# Patient Record
Sex: FEMALE | Race: WHITE | NOT HISPANIC OR LATINO | URBAN - METROPOLITAN AREA
[De-identification: names, ages, dates, MRNs, and addresses within clinical notes are randomized per-mention and may not be internally consistent; named-entity substitution may affect disease eponyms.]

---

## 2023-08-30 PROBLEM — R42 VERTIGO: Status: ACTIVE | Noted: 2023-08-30

## 2023-08-30 PROBLEM — F41.9 ANXIETY: Status: ACTIVE | Noted: 2023-08-30

## 2023-08-30 PROBLEM — E66.01 MORBID (SEVERE) OBESITY DUE TO EXCESS CALORIES (MULTI): Status: ACTIVE | Noted: 2023-08-30

## 2023-08-30 PROBLEM — E78.5 HYPERLIPIDEMIA: Status: ACTIVE | Noted: 2023-08-30

## 2023-08-30 PROBLEM — R53.83 FATIGUE: Status: ACTIVE | Noted: 2023-08-30

## 2023-08-30 PROBLEM — K64.8 INTERNAL HEMORRHOIDS: Status: ACTIVE | Noted: 2023-08-30

## 2023-08-30 PROBLEM — K29.90 GASTRITIS AND DUODENITIS: Status: ACTIVE | Noted: 2023-08-30

## 2023-08-30 PROBLEM — E66.9 OBESITY (BMI 30.0-34.9): Status: ACTIVE | Noted: 2023-08-30

## 2023-08-30 PROBLEM — R94.31 ABNORMAL EKG: Status: ACTIVE | Noted: 2023-08-30

## 2023-08-30 PROBLEM — F32.A DEPRESSION: Status: ACTIVE | Noted: 2023-08-30

## 2023-08-30 PROBLEM — I10 HYPERTENSION: Status: ACTIVE | Noted: 2023-08-30

## 2023-08-30 PROBLEM — R25.3 EYE MUSCLE TWITCHES: Status: ACTIVE | Noted: 2023-08-30

## 2023-08-30 PROBLEM — M54.31 RIGHT SIDED SCIATICA: Status: ACTIVE | Noted: 2023-08-30

## 2023-08-30 PROBLEM — E55.9 VITAMIN D DEFICIENCY: Status: ACTIVE | Noted: 2023-08-30

## 2023-08-30 PROBLEM — G47.33 OBSTRUCTIVE SLEEP APNEA SYNDROME: Status: ACTIVE | Noted: 2023-08-30

## 2023-08-30 PROBLEM — E66.811 OBESITY (BMI 30.0-34.9): Status: ACTIVE | Noted: 2023-08-30

## 2023-08-30 RX ORDER — AMLODIPINE BESYLATE 10 MG/1
1 TABLET ORAL DAILY
COMMUNITY
Start: 2022-07-21 | End: 2023-12-07

## 2023-08-30 RX ORDER — SEMAGLUTIDE 1.34 MG/ML
INJECTION, SOLUTION SUBCUTANEOUS
COMMUNITY

## 2023-08-30 RX ORDER — METOPROLOL SUCCINATE 100 MG/1
1 TABLET, EXTENDED RELEASE ORAL DAILY
COMMUNITY
Start: 2022-08-03 | End: 2024-02-14

## 2023-08-30 RX ORDER — SEMAGLUTIDE 1.34 MG/ML
INJECTION, SOLUTION SUBCUTANEOUS
COMMUNITY
Start: 2023-04-24

## 2023-08-30 RX ORDER — IBUPROFEN 600 MG/1
TABLET ORAL
COMMUNITY
Start: 2023-03-03

## 2023-08-30 RX ORDER — VENLAFAXINE HYDROCHLORIDE 150 MG/1
2 CAPSULE, EXTENDED RELEASE ORAL DAILY
COMMUNITY
Start: 2022-01-21

## 2023-10-10 ENCOUNTER — TELEPHONE (OUTPATIENT)
Dept: ENDOCRINOLOGY | Facility: CLINIC | Age: 48
End: 2023-10-10
Payer: COMMERCIAL

## 2023-11-15 ENCOUNTER — OFFICE VISIT (OUTPATIENT)
Dept: ENDOCRINOLOGY | Facility: CLINIC | Age: 48
End: 2023-11-15
Payer: COMMERCIAL

## 2023-11-15 VITALS
WEIGHT: 190 LBS | HEART RATE: 92 BPM | BODY MASS INDEX: 32.61 KG/M2 | SYSTOLIC BLOOD PRESSURE: 149 MMHG | DIASTOLIC BLOOD PRESSURE: 99 MMHG

## 2023-11-15 DIAGNOSIS — I10 PRIMARY HYPERTENSION: ICD-10-CM

## 2023-11-15 DIAGNOSIS — E66.01 MORBID (SEVERE) OBESITY DUE TO EXCESS CALORIES (MULTI): Primary | ICD-10-CM

## 2023-11-15 DIAGNOSIS — E66.9 OBESITY (BMI 30.0-34.9): ICD-10-CM

## 2023-11-15 DIAGNOSIS — E78.2 MIXED HYPERLIPIDEMIA: ICD-10-CM

## 2023-11-15 PROCEDURE — 3080F DIAST BP >= 90 MM HG: CPT | Performed by: NURSE PRACTITIONER

## 2023-11-15 PROCEDURE — 1036F TOBACCO NON-USER: CPT | Performed by: NURSE PRACTITIONER

## 2023-11-15 PROCEDURE — 3008F BODY MASS INDEX DOCD: CPT | Performed by: NURSE PRACTITIONER

## 2023-11-15 PROCEDURE — 3077F SYST BP >= 140 MM HG: CPT | Performed by: NURSE PRACTITIONER

## 2023-11-15 PROCEDURE — 99213 OFFICE O/P EST LOW 20 MIN: CPT | Performed by: NURSE PRACTITIONER

## 2023-11-15 ASSESSMENT — PATIENT HEALTH QUESTIONNAIRE - PHQ9
2. FEELING DOWN, DEPRESSED OR HOPELESS: SEVERAL DAYS
2. FEELING DOWN, DEPRESSED OR HOPELESS: NOT AT ALL
SUM OF ALL RESPONSES TO PHQ9 QUESTIONS 1 & 2: 0
1. LITTLE INTEREST OR PLEASURE IN DOING THINGS: NOT AT ALL

## 2023-11-15 ASSESSMENT — LIFESTYLE VARIABLES
HOW MANY STANDARD DRINKS CONTAINING ALCOHOL DO YOU HAVE ON A TYPICAL DAY: 1 OR 2
HOW OFTEN DO YOU HAVE A DRINK CONTAINING ALCOHOL: MONTHLY OR LESS

## 2023-11-15 ASSESSMENT — PAIN SCALES - GENERAL: PAINLEVEL: 6

## 2023-11-15 NOTE — PROGRESS NOTES
"      Chief Complaint   Patient presents with    Weight Check         HPI:    49 yo presents for follow up/ wt managment. Pt with Obesity BMI 37, depression, htn, borderline lipids, DICK on cpap. Ozempic was increased to 1mg dose weekly at her last visit, has lost 18 pounds in between visit. Weight was 227 pounds Nov 2022, current weight 190 pounds.         -pt weighted about 130lbs in early 20's prior to pregnancy at ht of 5'4\"         -weight gain has been gradual during her 4 pregnancies         -during stressful times pt has overeaten and gained wt         -pt had tried WW in the past with some result many years ago         -had lost 10lbs in the past doing Keto diet         -during covid-19 pt gained as much as 24 lbs         -pt took contrave about 1 year and was able to lose 10lbs, when she stopped it she regained this wt plus an additional 10lbs         Diet: some struggles when stressed, children at home and tempted with some of their snacks         -pt is physical at work on feet/walking.          Current Outpatient Medications:     amLODIPine (Norvasc) 10 mg tablet, Take 1 tablet (10 mg) by mouth once daily., Disp: , Rfl:     ibuprofen 600 mg tablet, , Disp: , Rfl:     triamcinolone acetonide (NASACORT AQ NASL), , Disp: , Rfl:     venlafaxine XR (Effexor-XR) 150 mg 24 hr capsule, Take 2 capsules (300 mg) by mouth once daily., Disp: , Rfl:     metoprolol succinate XL (Toprol-XL) 100 mg 24 hr tablet, Take 1 tablet (100 mg) by mouth once daily., Disp: , Rfl:     semaglutide (Ozempic) 0.25 mg or 0.5 mg(2 mg/1.5 mL) pen injector, , Disp: , Rfl:     semaglutide (Ozempic) 1 mg/dose (4 mg/3 mL) pen injector, , Disp: , Rfl:     BP (!) 149/99 (BP Location: Left arm, Patient Position: Sitting)   Pulse 92   Wt 86.2 kg (190 lb)   BMI 32.61 kg/m²      Past Medical History:   Diagnosis Date    Borderline high cholesterol     Depression     Hypertension     Obesity     Seasonal allergies     Sleep apnea           ROS: "      CARDIOLOGY:          Lightheadedness denies.  Chest pain denies.  Leg edema denies.  Palpitations denies.         RESPIRATORY:          Cough denies.  Shortness of breath denies.  Wheezing denies.         GASTROENTEROLOGY:          Abdominal pain denies.  Constipation denies.  Diarrhea denies.  Heartburn denies.         ENDOCRINOLOGY:          Cold intolerance denies.  Excessive sweating denies.  Heat intolerance denies.  Tremulousness denies.         NEUROLOGY:          Burning pain in feet denies.  Burning pain in hands denies.         PSYCHOLOGY:          Low energy denies.  Irritability denies.  Sleep disturbances denies.           Examination:     General Examination:         GENERAL APPEARANCE:  Pleasant and cooperative,No Acute Distress.          NECK: no lymphadenopathy,no thyromegaly, no dominant thyroid nodules.          HEART: no murmurs, click or rubs, regular rate and rhythm, normal S1S2.          LUNGS: clear to auscultation bilaterally, no wheezes/rhonchi/rales.          EXTREMITIES: no edema, 2+ pulses           Lab Results   Component Value Date    TSH 1.62 07/05/2022       Chemistry    Lab Results   Component Value Date/Time     07/05/2022 1411    K 4.2 07/05/2022 1411     07/05/2022 1411    CO2 25 07/05/2022 1411    BUN 13 07/05/2022 1411    CREATININE 0.9 07/05/2022 1411    Lab Results   Component Value Date/Time    CALCIUM 9.1 07/05/2022 1411    ALKPHOS 62 07/05/2022 1411    AST 15 07/05/2022 1411    ALT 14 07/05/2022 1411    BILITOT 0.3 07/05/2022 1411          Lab Results   Component Value Date    WBC 8.2 07/05/2022    HGB 14.2 07/05/2022    HCT 42.4 07/05/2022    MCV 88.9 07/05/2022     07/05/2022     Lab Results   Component Value Date    HGBA1C 5.5 03/14/2022       1. Morbid (severe) obesity due to excess calories (CMS/[facility])  -excellent reduction in weight  -continue with 2 mg dose ozempic      2. Primary hypertension  -elevated, did not take med's yet today    Follow  up CNP 6 months      -previous labs and notes reviewed  -labs/tests mentioned in above note reviewed and interpreted  -reviewed and counseled on medication monitoring and side effects.

## 2023-12-07 DIAGNOSIS — I10 ESSENTIAL (PRIMARY) HYPERTENSION: ICD-10-CM

## 2023-12-07 RX ORDER — AMLODIPINE BESYLATE 10 MG/1
10 TABLET ORAL DAILY
Qty: 90 TABLET | Refills: 1 | Status: SHIPPED | OUTPATIENT
Start: 2023-12-07

## 2024-01-08 DIAGNOSIS — E66.9 OBESITY (BMI 30.0-34.9): ICD-10-CM

## 2024-01-08 DIAGNOSIS — E66.01 MORBID (SEVERE) OBESITY DUE TO EXCESS CALORIES (MULTI): Primary | ICD-10-CM

## 2024-01-18 ENCOUNTER — TELEPHONE (OUTPATIENT)
Dept: ENDOCRINOLOGY | Facility: CLINIC | Age: 49
End: 2024-01-18
Payer: COMMERCIAL

## 2024-01-22 ENCOUNTER — TELEPHONE (OUTPATIENT)
Dept: ENDOCRINOLOGY | Facility: CLINIC | Age: 49
End: 2024-01-22
Payer: COMMERCIAL

## 2024-01-22 NOTE — TELEPHONE ENCOUNTER
Pt's Ozempic PA was denied due to the Pt not having a A1C of 6.5 or higher, Pt wanted to let you know. Pt states that she has not had a A1C done in along time, and she has been taking Ozempic for over a year. Please advise.

## 2024-02-14 ENCOUNTER — OFFICE VISIT (OUTPATIENT)
Dept: ENDOCRINOLOGY | Facility: CLINIC | Age: 49
End: 2024-02-14
Payer: COMMERCIAL

## 2024-02-14 VITALS
WEIGHT: 185.8 LBS | HEART RATE: 96 BPM | DIASTOLIC BLOOD PRESSURE: 87 MMHG | BODY MASS INDEX: 31.89 KG/M2 | SYSTOLIC BLOOD PRESSURE: 131 MMHG

## 2024-02-14 DIAGNOSIS — I10 PRIMARY HYPERTENSION: ICD-10-CM

## 2024-02-14 DIAGNOSIS — E66.9 OBESITY (BMI 30.0-34.9): Primary | ICD-10-CM

## 2024-02-14 DIAGNOSIS — E78.2 MIXED HYPERLIPIDEMIA: ICD-10-CM

## 2024-02-14 PROCEDURE — 3075F SYST BP GE 130 - 139MM HG: CPT | Performed by: NURSE PRACTITIONER

## 2024-02-14 PROCEDURE — 3079F DIAST BP 80-89 MM HG: CPT | Performed by: NURSE PRACTITIONER

## 2024-02-14 PROCEDURE — 1036F TOBACCO NON-USER: CPT | Performed by: NURSE PRACTITIONER

## 2024-02-14 PROCEDURE — 99213 OFFICE O/P EST LOW 20 MIN: CPT | Performed by: NURSE PRACTITIONER

## 2024-02-14 PROCEDURE — 3008F BODY MASS INDEX DOCD: CPT | Performed by: NURSE PRACTITIONER

## 2024-02-14 RX ORDER — LISINOPRIL 10 MG/1
10 TABLET ORAL
COMMUNITY
Start: 2024-01-16

## 2024-02-14 RX ORDER — BUPROPION HYDROCHLORIDE 150 MG/1
1 TABLET ORAL
COMMUNITY
Start: 2024-01-29

## 2024-02-14 RX ORDER — SEMAGLUTIDE 0.25 MG/.5ML
0.25 INJECTION, SOLUTION SUBCUTANEOUS
Qty: 2 ML | Refills: 0 | Status: SHIPPED | OUTPATIENT
Start: 2024-02-14

## 2024-02-14 ASSESSMENT — PATIENT HEALTH QUESTIONNAIRE - PHQ9
1. LITTLE INTEREST OR PLEASURE IN DOING THINGS: NOT AT ALL
SUM OF ALL RESPONSES TO PHQ9 QUESTIONS 1 AND 2: 0
2. FEELING DOWN, DEPRESSED OR HOPELESS: NOT AT ALL

## 2024-02-14 ASSESSMENT — PAIN SCALES - GENERAL: PAINLEVEL: 6

## 2024-02-14 ASSESSMENT — ENCOUNTER SYMPTOMS: DEPRESSION: 0

## 2024-02-14 NOTE — PROGRESS NOTES
"HPI     49 yo presents for follow up/ wt managment. Pt with Obesity BMI 31 (was 37) depression, htn, borderline lipids, DICK on cpap. Ozempic no longer covered for weight loss. Last dose Dec 2023. Lowest weight 183 lbs. Weight was 227 pounds Nov 2022, current weight 185 pounds. She would like to see if Wegovy is covered.            -pt weighted about 130lbs in early 20's prior to pregnancy at ht of 5'4\"         -weight gain has been gradual during her 4 pregnancies         -during stressful times pt has overeaten and gained wt         -pt had tried WW in the past with some result many years ago         -had lost 10lbs in the past doing Keto diet         -during covid-19 pt gained as much as 24 lbs         -pt took contrave about 1 year and was able to lose 10lbs, when she stopped it she regained this wt plus an additional 10lbs         Diet: some struggles when stressed, children at home and tempted with some of their snacks         -pt is physical at work on feet/walking.        Current Outpatient Medications:     amLODIPine (Norvasc) 10 mg tablet, TAKE 1 TABLET BY MOUTH EVERY DAY, Disp: 90 tablet, Rfl: 1    buPROPion XL (Wellbutrin XL) 150 mg 24 hr tablet, Take 1 tablet (150 mg) by mouth once daily in the morning. Take before meals., Disp: , Rfl:     ibuprofen 600 mg tablet, , Disp: , Rfl:     lisinopril 10 mg tablet, Take 1 tablet (10 mg) by mouth once daily., Disp: , Rfl:     triamcinolone acetonide (NASACORT AQ NASL), , Disp: , Rfl:     triamcinolone acetonide (NASACORT AQ NASL), Nasacort AQ, Disp: , Rfl:     venlafaxine XR (Effexor-XR) 150 mg 24 hr capsule, Take 2 capsules (300 mg) by mouth once daily., Disp: , Rfl:     semaglutide (Ozempic) 0.25 mg or 0.5 mg(2 mg/1.5 mL) pen injector, , Disp: , Rfl:     semaglutide (Ozempic) 1 mg/dose (4 mg/3 mL) pen injector, , Disp: , Rfl:     semaglutide, weight loss, (Wegovy) 0.25 mg/0.5 mL pen injector, Inject 0.25 mg under the skin every 7 days., Disp: 2 mL, Rfl: " 0      Allergies as of 02/14/2024    (No Known Allergies)         Review of Systems   Cardiology: Lightheadedness-denies.  Chest pain-denies.  Leg edema-denies.  Palpitations-denies.  Respiratory: Cough-denies. Shortness of breath-denies.  Wheezing-denies.  Gastroenterology: Constipation-denies.  Diarrhea-denies.  Heartburn-denies.  Endocrinology: Cold intolerance-denies.  Heat intolerance-denies.  Sweats-denies.  Neurology: Headache-denies.  Tremor-denies.  Neuropathy in extremities-denies.  Psychology: Low energy-denies.  Irritability-denies.  Sleep disturbances-denies.      /87   Pulse 96   Wt 84.3 kg (185 lb 12.8 oz)   BMI 31.89 kg/m²       Labs:  Lab Results   Component Value Date    WBC 8.2 07/05/2022    NRBC 0 07/05/2022    RBC 4.77 07/05/2022    HGB 14.2 07/05/2022    HCT 42.4 07/05/2022     07/05/2022     Lab Results   Component Value Date    CALCIUM 9.1 07/05/2022    AST 15 07/05/2022    ALKPHOS 62 07/05/2022    BILITOT 0.3 07/05/2022    PROT 7.1 07/05/2022    ALBUMIN 4.2 07/05/2022    GLOB 2.9 07/05/2022    AGR 1.4 (L) 07/05/2022     07/05/2022    K 4.2 07/05/2022     07/05/2022    CO2 25 07/05/2022    ANIONGAP 11 07/05/2022    BUN 13 07/05/2022    CREATININE 0.9 07/05/2022    UREACREAUR 14.4 07/05/2022    GLUCOSE 91 07/05/2022    ALT 14 07/05/2022    EGFR 79 07/05/2022     Lab Results   Component Value Date    CHOL 203 (H) 01/23/2020    TRIG 183 (H) 01/23/2020    HDL 48 (L) 01/23/2020    LDLCALC 118 01/23/2020     Lab Results   Component Value Date    TSH 1.62 07/05/2022     Lab Results   Component Value Date    HURGHKEP38 621 11/27/2018     Lab Results   Component Value Date    HGBA1C 5.5 03/14/2022         Physical Exam   General Appearance: pleasant, cooperative, no acute distress  HEENT: no chemosis, no proptosis, no lid lag, no lid retraction  Neck: no lymphadenopathy, no thyromegaly, no dominant thyroid nodules  Heart: no murmurs, regular rate and rhythm, S1 and  S2  Lungs: no wheezes, no rhonci, no rales  Extremities: no lower extremity swelling      Assessment/Plan   1. Obesity (BMI 30.0-34.9)  -meets criteria for GLP1 therapy  -has shown to be effective with recent use of Ozempic  -high risk co-morbidities     - semaglutide, weight loss, (Wegovy) 0.25 mg/0.5 mL pen injector; Inject 0.25 mg under the skin every 7 days.  Dispense: 2 mL; Refill: 0    2. Primary hypertension  -managed by PCP    3. Mixed hyperlipidemia  -managed by PC       Follow Up: 3 months if Wegovy started      -labs/tests/notes reviewed  -reviewed and counseled patient on medication monitoring and side effects

## 2024-03-22 DIAGNOSIS — E66.8 OTHER OBESITY: ICD-10-CM

## 2024-03-22 RX ORDER — SEMAGLUTIDE 0.68 MG/ML
INJECTION, SOLUTION SUBCUTANEOUS
Qty: 3 ML | Refills: 6 | Status: SHIPPED | OUTPATIENT
Start: 2024-03-22

## 2024-05-15 ENCOUNTER — APPOINTMENT (OUTPATIENT)
Dept: ENDOCRINOLOGY | Facility: CLINIC | Age: 49
End: 2024-05-15
Payer: COMMERCIAL

## 2024-06-14 ENCOUNTER — APPOINTMENT (OUTPATIENT)
Dept: ENDOCRINOLOGY | Facility: CLINIC | Age: 49
End: 2024-06-14
Payer: COMMERCIAL

## 2024-06-14 VITALS
DIASTOLIC BLOOD PRESSURE: 92 MMHG | HEIGHT: 64 IN | HEART RATE: 91 BPM | BODY MASS INDEX: 33.12 KG/M2 | SYSTOLIC BLOOD PRESSURE: 130 MMHG | WEIGHT: 194 LBS

## 2024-06-14 DIAGNOSIS — E66.9 OBESITY (BMI 30.0-34.9): Primary | ICD-10-CM

## 2024-06-14 DIAGNOSIS — E78.2 MIXED HYPERLIPIDEMIA: ICD-10-CM

## 2024-06-14 DIAGNOSIS — I10 PRIMARY HYPERTENSION: ICD-10-CM

## 2024-06-14 PROCEDURE — 1036F TOBACCO NON-USER: CPT | Performed by: NURSE PRACTITIONER

## 2024-06-14 PROCEDURE — 3080F DIAST BP >= 90 MM HG: CPT | Performed by: NURSE PRACTITIONER

## 2024-06-14 PROCEDURE — 99213 OFFICE O/P EST LOW 20 MIN: CPT | Performed by: NURSE PRACTITIONER

## 2024-06-14 PROCEDURE — 3075F SYST BP GE 130 - 139MM HG: CPT | Performed by: NURSE PRACTITIONER

## 2024-06-14 RX ORDER — SEMAGLUTIDE 0.5 MG/.5ML
0.5 INJECTION, SOLUTION SUBCUTANEOUS
Qty: 2 ML | Refills: 2 | Status: SHIPPED | OUTPATIENT
Start: 2024-06-28

## 2024-06-14 ASSESSMENT — ENCOUNTER SYMPTOMS
OCCASIONAL FEELINGS OF UNSTEADINESS: 0
DEPRESSION: 0
LOSS OF SENSATION IN FEET: 0

## 2024-06-14 ASSESSMENT — LIFESTYLE VARIABLES
SKIP TO QUESTIONS 9-10: 1
AUDIT-C TOTAL SCORE: 1
HOW OFTEN DO YOU HAVE SIX OR MORE DRINKS ON ONE OCCASION: NEVER
HOW OFTEN DO YOU HAVE A DRINK CONTAINING ALCOHOL: MONTHLY OR LESS
HOW MANY STANDARD DRINKS CONTAINING ALCOHOL DO YOU HAVE ON A TYPICAL DAY: 1 OR 2

## 2024-06-14 ASSESSMENT — PAIN SCALES - GENERAL: PAINLEVEL: 0-NO PAIN

## 2024-06-14 ASSESSMENT — PATIENT HEALTH QUESTIONNAIRE - PHQ9
2. FEELING DOWN, DEPRESSED OR HOPELESS: NOT AT ALL
SUM OF ALL RESPONSES TO PHQ9 QUESTIONS 1 & 2: 0
1. LITTLE INTEREST OR PLEASURE IN DOING THINGS: NOT AT ALL

## 2024-06-14 NOTE — PATIENT INSTRUCTIONS
CALL ME IN 2 WEEKS SO I CAN INCREASE DOSE  IF YOU TOLERATE 0.50 MG IN 4 WEEKS I CAN SEND 1 MG DOSE  WE CAN INCREASE THIS MONTHLY IF YOU TOLERATE WITH

## 2024-06-14 NOTE — PROGRESS NOTES
"HPI   49 yo presents for follow up/ wt managment. Pt with Obesity BMI 33 (was 37) depression, htn, borderline lipids, DICK on cpap. Ozempic no longer covered for weight loss. Last dose Dec 2023. Lowest weight 183 lbs. Weight was 227 pounds Wegovy is covered, She just restarted at 0.25 mg dose this week.            -pt weighted about 130lbs in early 20's prior to pregnancy at ht of 5'4\"         -weight gain has been gradual during her 4 pregnancies         -during stressful times pt has overeaten and gained wt         -pt had tried WW in the past with some result many years ago         -had lost 10lbs in the past doing Keto diet         -during covid-19 pt gained as much as 24 lbs         -pt took contrave about 1 year and was able to lose 10lbs, when she stopped it she regained this wt plus an additional 10lbs         Diet: some struggles when stressed, children at home and tempted with some of their snacks         -pt is physical at work on feet/walking.    Current Outpatient Medications:     amLODIPine (Norvasc) 10 mg tablet, TAKE 1 TABLET BY MOUTH EVERY DAY, Disp: 90 tablet, Rfl: 1    buPROPion XL (Wellbutrin XL) 150 mg 24 hr tablet, Take 1 tablet (150 mg) by mouth once daily in the morning. Take before meals., Disp: , Rfl:     ibuprofen 600 mg tablet, , Disp: , Rfl:     lisinopril 10 mg tablet, Take 1 tablet (10 mg) by mouth once daily., Disp: , Rfl:     semaglutide, weight loss, (Wegovy) 0.25 mg/0.5 mL pen injector, Inject 0.25 mg under the skin every 7 days., Disp: 2 mL, Rfl: 0    triamcinolone acetonide (NASACORT AQ NASL), , Disp: , Rfl:     triamcinolone acetonide (NASACORT AQ NASL), Nasacort AQ, Disp: , Rfl:     venlafaxine XR (Effexor-XR) 150 mg 24 hr capsule, Take 2 capsules (300 mg) by mouth once daily., Disp: , Rfl:       Allergies as of 06/14/2024    (No Known Allergies)         Review of Systems   Cardiology: Lightheadedness-denies.  Chest pain-denies.  Leg edema-denies.  " "Palpitations-denies.  Respiratory: Cough-denies. Shortness of breath-denies.  Wheezing-denies.  Gastroenterology: Constipation-denies.  Diarrhea-denies.  Heartburn-denies.  Endocrinology: Cold intolerance-denies.  Heat intolerance-denies.  Sweats-denies.  Neurology: Headache-denies.  Tremor-denies.  Neuropathy in extremities-denies.  Psychology: Low energy-denies.  Irritability-denies.  Sleep disturbances-denies.      BP (!) 130/92   Pulse 91   Ht 1.626 m (5' 4\")   Wt 88 kg (194 lb)   BMI 33.30 kg/m²       Labs:    Physical Exam   General Appearance: pleasant, cooperative, no acute distress  HEENT: no chemosis, no proptosis, no lid lag, no lid retraction  Neck: no lymphadenopathy, no thyromegaly, no dominant thyroid nodules  Heart: no murmurs, regular rate and rhythm, S1 and S2  Lungs: no wheezes, no rhonci, no rales  Extremities: no lower extremity swelling      Assessment/Plan     1. Obesity (BMI 30.0-34.9)  -meets criteria for GLP1 therapy  -started wegovy this week 0.25 mg   -plan to titrate up dose as tolerated for weight loss she will call me with tolerance  -reviewed reportable side effects with GLP1 medications  -high risk co-morbidities     2. Primary hypertension  -managed by PCP    3. Mixed hyperlipidemia  -managed by PCP    Follow Up: 3 months CNP    -labs/tests/notes reviewed  -reviewed and counseled patient on medication monitoring and side effects    "

## 2024-06-18 DIAGNOSIS — I10 ESSENTIAL (PRIMARY) HYPERTENSION: ICD-10-CM

## 2024-06-18 RX ORDER — AMLODIPINE BESYLATE 10 MG/1
10 TABLET ORAL DAILY
Qty: 90 TABLET | Refills: 1 | Status: SHIPPED | OUTPATIENT
Start: 2024-06-18

## 2024-06-27 ENCOUNTER — TELEPHONE (OUTPATIENT)
Dept: ENDOCRINOLOGY | Facility: CLINIC | Age: 49
End: 2024-06-27
Payer: COMMERCIAL

## 2024-06-27 NOTE — TELEPHONE ENCOUNTER
Patient calling to advise she is due for her next dose increase by Monday of Wegovy.  She is requesting this increased dose be sent to [facility], [place]     Please assist and advise.     Bebe

## 2024-07-03 DIAGNOSIS — E66.9 OBESITY (BMI 30.0-34.9): ICD-10-CM

## 2024-07-03 RX ORDER — SEMAGLUTIDE 0.25 MG/.5ML
0.25 INJECTION, SOLUTION SUBCUTANEOUS
Qty: 2 ML | Refills: 5 | Status: SHIPPED | OUTPATIENT
Start: 2024-07-03

## 2024-07-05 DIAGNOSIS — E66.9 OBESITY (BMI 30.0-34.9): ICD-10-CM

## 2024-07-05 PROCEDURE — RXMED WILLOW AMBULATORY MEDICATION CHARGE

## 2024-07-05 RX ORDER — SEMAGLUTIDE 0.5 MG/.5ML
0.5 INJECTION, SOLUTION SUBCUTANEOUS
Qty: 2 ML | Refills: 0 | Status: SHIPPED | OUTPATIENT
Start: 2024-07-05 | End: 2024-07-05 | Stop reason: SDUPTHER

## 2024-07-05 RX ORDER — SEMAGLUTIDE 0.5 MG/.5ML
0.5 INJECTION, SOLUTION SUBCUTANEOUS
Qty: 2 ML | Refills: 5 | Status: SHIPPED | OUTPATIENT
Start: 2024-07-05

## 2024-07-06 ENCOUNTER — PHARMACY VISIT (OUTPATIENT)
Dept: PHARMACY | Facility: CLINIC | Age: 49
End: 2024-07-06
Payer: MEDICARE

## 2024-07-06 PROCEDURE — RXOTC WILLOW AMBULATORY OTC CHARGE

## 2024-07-30 PROCEDURE — RXMED WILLOW AMBULATORY MEDICATION CHARGE

## 2024-08-01 ENCOUNTER — PHARMACY VISIT (OUTPATIENT)
Dept: PHARMACY | Facility: CLINIC | Age: 49
End: 2024-08-01
Payer: MEDICARE

## 2024-12-04 ENCOUNTER — OFFICE VISIT (OUTPATIENT)
Dept: OBSTETRICS AND GYNECOLOGY | Facility: CLINIC | Age: 49
End: 2024-12-04
Payer: COMMERCIAL

## 2024-12-04 VITALS
BODY MASS INDEX: 31.41 KG/M2 | WEIGHT: 184 LBS | SYSTOLIC BLOOD PRESSURE: 128 MMHG | HEIGHT: 64 IN | DIASTOLIC BLOOD PRESSURE: 79 MMHG

## 2024-12-04 DIAGNOSIS — N89.8 VAGINAL IRRITATION: ICD-10-CM

## 2024-12-04 DIAGNOSIS — Z01.419 ENCOUNTER FOR ANNUAL ROUTINE GYNECOLOGICAL EXAMINATION: Primary | ICD-10-CM

## 2024-12-04 DIAGNOSIS — Z11.51 SCREENING FOR HUMAN PAPILLOMAVIRUS (HPV): ICD-10-CM

## 2024-12-04 DIAGNOSIS — Z12.31 ENCOUNTER FOR SCREENING MAMMOGRAM FOR MALIGNANT NEOPLASM OF BREAST: ICD-10-CM

## 2024-12-04 PROCEDURE — 87205 SMEAR GRAM STAIN: CPT

## 2024-12-04 PROCEDURE — 99386 PREV VISIT NEW AGE 40-64: CPT

## 2024-12-04 PROCEDURE — 3074F SYST BP LT 130 MM HG: CPT

## 2024-12-04 PROCEDURE — 3008F BODY MASS INDEX DOCD: CPT

## 2024-12-04 PROCEDURE — 3078F DIAST BP <80 MM HG: CPT

## 2024-12-04 PROCEDURE — 1036F TOBACCO NON-USER: CPT

## 2024-12-04 ASSESSMENT — LIFESTYLE VARIABLES
SKIP TO QUESTIONS 9-10: 1
HOW MANY STANDARD DRINKS CONTAINING ALCOHOL DO YOU HAVE ON A TYPICAL DAY: PATIENT DOES NOT DRINK
AUDIT-C TOTAL SCORE: 0
HOW OFTEN DO YOU HAVE SIX OR MORE DRINKS ON ONE OCCASION: NEVER
HOW OFTEN DO YOU HAVE A DRINK CONTAINING ALCOHOL: NEVER

## 2024-12-04 ASSESSMENT — ENCOUNTER SYMPTOMS
DIZZINESS: 0
COLOR CHANGE: 0
CHILLS: 0
ABDOMINAL PAIN: 0
DYSURIA: 0
SHORTNESS OF BREATH: 0
NAUSEA: 0
OCCASIONAL FEELINGS OF UNSTEADINESS: 0
UNEXPECTED WEIGHT CHANGE: 0
HEADACHES: 0
FREQUENCY: 1
COUGH: 0
FATIGUE: 0
CONSTIPATION: 1
FEVER: 0
DEPRESSION: 0
LOSS OF SENSATION IN FEET: 0
VOMITING: 0

## 2024-12-04 ASSESSMENT — PAIN SCALES - GENERAL: PAINLEVEL_OUTOF10: 0-NO PAIN

## 2024-12-04 NOTE — PROGRESS NOTES
Answers submitted by the patient for this visit:  Female Genital Questionnaire (Submitted on 12/4/2024)  Chief Complaint: Female genitourinary complaint  genital itching: Yes  vaginal discharge: Yes  Chronicity: chronic  Onset: more than 1 year ago  Frequency: daily  Progression since onset: gradually worsening  Pain severity: mild  Affected side: both  Pregnant now?: No  constipation: Yes  frequency: Yes  joint pain: Yes  painful intercourse: Yes  Aggravated by: activity  Sexual activity: sexually active  Partner with STD symptoms: no  Birth control: the rhythm method  Menstrual history: irregular  Discharge characteristics: milky  Passing clots?: No  Passing tissue?: No

## 2024-12-04 NOTE — PROGRESS NOTES
"Iban Albert \"Domenica\" is a 49 y.o. female who is here for a routine GYN exam; last saw Dr. Ga 2020.  -Menses still about once monthly; sometimes earlier vs later each month; lighter flow and shorter, 2-3 days duration.  -Denies breast changes or concerns.  -Occasional hot flash but denies night sweats; not necessarily bothersome.  -Does c/o recent frequent vaginal itching; more prominent at vaginal opening; some white discharge as well; no associated odor or burning. Using honey pot or summer's georgiana soap. Does wear daily panty liner.  -Denies urinary symptom.   -Sometimes with dryness during intercourse; using lubricants; denies dyspareunia or PCB.   -On Wegovy with good weight loss; 20 lb down since last visit .    Complaints:   see hpi  Periods: regular   Dysmenorrhea:  none    Current contraception: BTL  History of abnormal Pap smear: no  History of abnormal mammogram: no      OB History          5    Para   4    Term   4            AB   1    Living   4         SAB   1    IAB        Ectopic        Multiple        Live Births   4                  Review of Systems   Constitutional:  Negative for chills, fatigue, fever and unexpected weight change.   Respiratory:  Negative for cough and shortness of breath.    Gastrointestinal:  Negative for abdominal pain, nausea and vomiting.   Genitourinary:  Positive for frequency and vaginal discharge (with dryness and irritation). Negative for dyspareunia, dysuria and pelvic pain.   Skin:  Negative for color change and rash.   Neurological:  Negative for dizziness and headaches.       Objective   Ht 1.626 m (5' 4\")   Wt 83.5 kg (184 lb)   LMP 2024   BMI 31.58 kg/m²        General:   Alert and oriented, in no acute distress   Neck: Supple. No visible thyromegaly.    Breast/Axilla: Normal to palpation bilaterally without masses, skin changes, or nipple discharge.    Abdomen: Soft, non-tender, without masses or organomegaly "   Vulva: Normal architecture without erythema, masses, or lesions.    Vagina: Normal mucosa without lesions, masses, or atrophy. Tiny amt white discharge. Swab obtained    Cervix: Normal without masses, lesions, or signs of cervicitis; pap performed    Uterus: Normal, mobile, non-enlarged uterus   Adnexa: Normal without masses or lesions   Pelvic Floor Normal    Psych Normal affect. Normal mood.      Assessment/Plan   Diagnoses and all orders for this visit:  Encounter for annual routine gynecological examination  -     THINPREP PAP TEST  - Rvwd perimenopause/ashlie transition, associated hormonal changes and menstrual pattern irregularities.   Screening for human papillomavirus (HPV)  -     THINPREP PAP TEST  Encounter for screening mammogram for malignant neoplasm of breast  -     BI mammo bilateral screening tomosynthesis; Future  - UTD 6/2024 benign through [facility]; future order for 6/2025 placed.  Vaginal irritation  -     Vaginitis Gram Stain For Bacterial Vaginosis + Yeast  - Will r/o yeast infection.   - Recommended A/D or Desitin use along vulvar region with daily panty liner use.   - Recommended unscented Dove soap and unscented detergent to avoid irritants.   - Encouraged lubricants during intercourse, and a vaginal moisturizer such as Replens or Hyalogyn 1-2x/week for vaginal tissue moisture.     Camille Tran PA-C

## 2024-12-05 LAB
CLUE CELLS VAG LPF-#/AREA: PRESENT /[LPF]
NUGENT SCORE: 8
YEAST VAG WET PREP-#/AREA: ABNORMAL

## 2024-12-06 DIAGNOSIS — E66.811 OBESITY (BMI 30.0-34.9): ICD-10-CM

## 2024-12-06 DIAGNOSIS — B96.89 BACTERIAL VAGINOSIS: Primary | ICD-10-CM

## 2024-12-06 DIAGNOSIS — N76.0 BACTERIAL VAGINOSIS: Primary | ICD-10-CM

## 2024-12-06 RX ORDER — METRONIDAZOLE 7.5 MG/G
GEL VAGINAL DAILY
Qty: 70 G | Refills: 0 | Status: SHIPPED | OUTPATIENT
Start: 2024-12-06 | End: 2024-12-11

## 2024-12-06 RX ORDER — SEMAGLUTIDE 0.5 MG/.5ML
0.5 INJECTION, SOLUTION SUBCUTANEOUS
Qty: 2 ML | Refills: 5 | Status: SHIPPED | OUTPATIENT
Start: 2024-12-06

## 2024-12-06 NOTE — TELEPHONE ENCOUNTER
We are sending your prescription to [facility] for benefits investigation and affordability support.      If you have any questions or would like to check the status of your prescription(s),  please contact the pharmacy directly at [phone].

## 2024-12-11 PROCEDURE — RXMED WILLOW AMBULATORY MEDICATION CHARGE

## 2024-12-19 ENCOUNTER — PHARMACY VISIT (OUTPATIENT)
Dept: PHARMACY | Facility: CLINIC | Age: 49
End: 2024-12-19
Payer: MEDICARE

## 2024-12-19 PROCEDURE — RXOTC WILLOW AMBULATORY OTC CHARGE

## 2025-06-03 NOTE — PROGRESS NOTES
"HPI   48 yo presents for follow up/ wt management. Pt with Obesity BMI 33 (was 37) depression, htn, borderline lipids, DICK on cpap, highest wt 227 lbs.      Previous wt hx:         -pt weighted about 130lbs in early 20's prior to pregnancy at ht of 5'4\"         -weight gain has been gradual during her 4 pregnancies         -during stressful times pt has overeaten and gained wt         -pt had tried WW in the past with some result many years ago         -had lost 10lbs in the past doing Keto diet         -during covid-19 pt gained as much as 24 lbs         -pt took contrave about 1 year and was able to lose 10lbs, when she stopped it she regained this wt plus an additional 10lbs         Diet: some struggles when stressed, children at home and tempted with some of their snacks         -pt is physical at work on feet/walking.    Currently:  -off wegovy 0.5mg due to cost in the past 2 months  -wt coming back along with appetite    -labs through [facility]      Current Outpatient Medications:     amLODIPine (Norvasc) 10 mg tablet, TAKE 1 TABLET BY MOUTH EVERY DAY, Disp: 90 tablet, Rfl: 1    buPROPion XL (Wellbutrin XL) 150 mg 24 hr tablet, Take 1 tablet (150 mg) by mouth once daily in the morning. Take before meals., Disp: , Rfl:     semaglutide, weight loss, (Wegovy) 0.5 mg/0.5 mL pen injector, Inject 0.5 mg under the skin every 7 days., Disp: 2 mL, Rfl: 5    triamcinolone acetonide (NASACORT AQ NASL), Nasacort AQ, Disp: , Rfl:     venlafaxine XR (Effexor-XR) 150 mg 24 hr capsule, Take 2 capsules (300 mg) by mouth once daily., Disp: , Rfl:       Allergies as of 06/04/2025 - Reviewed 06/04/2025   Allergen Reaction Noted    House dust Itching 02/06/2018         Review of Systems   Cardiology: Lightheadedness-denies.  Chest pain-denies.  Leg edema-denies.  Palpitations-denies.  Respiratory: Cough-denies. Shortness of breath-denies.  Wheezing-denies.  Gastroenterology: Constipation +  Diarrhea-denies.  " "Heartburn-denies.  Endocrinology: Cold intolerance-denies.  Heat intolerance-denies.  Sweats-denies.  Neurology: Headache-denies.  Tremor-denies.  Neuropathy in extremities-denies.  Psychology: Low energy +  Irritability-denies.  Sleep disturbances-denies.      /78 (BP Location: Right arm, Patient Position: Sitting)   Ht 1.626 m (5' 4\")   Wt 88.5 kg (195 lb 3.2 oz)   BMI 33.51 kg/m²       Labs:  Lab Results   Component Value Date    WBC 8.2 07/05/2022    NRBC 0 07/05/2022    RBC 4.77 07/05/2022    HGB 14.2 07/05/2022    HCT 42.4 07/05/2022     07/05/2022     Lab Results   Component Value Date    CALCIUM 9.1 07/05/2022    AST 15 07/05/2022    ALKPHOS 62 07/05/2022    BILITOT 0.3 07/05/2022    PROT 7.1 07/05/2022    ALBUMIN 4.2 07/05/2022    GLOB 2.9 07/05/2022    AGR 1.4 (L) 07/05/2022     07/05/2022    K 4.2 07/05/2022     07/05/2022    CO2 25 07/05/2022    ANIONGAP 11 07/05/2022    BUN 13 07/05/2022    CREATININE 0.9 07/05/2022    UREACREAUR 14.4 07/05/2022    GLUCOSE 91 07/05/2022    ALT 14 07/05/2022    EGFR 79 07/05/2022     Lab Results   Component Value Date    CHOL 203 (H) 01/23/2020    TRIG 183 (H) 01/23/2020    HDL 48 (L) 01/23/2020    LDLCALC 118 01/23/2020     No results found for: \"MICROALBCREA\"  Lab Results   Component Value Date    TSH 1.62 07/05/2022     Lab Results   Component Value Date    [id number] 621 11/27/2018     Lab Results   Component Value Date    HGBA1C 5.5 03/14/2022         Physical Exam   General Appearance: pleasant, cooperative, no acute distress  HEENT: no chemosis, no proptosis, no lid lag, no lid retraction  Neck: no lymphadenopathy, no thyromegaly, no dominant thyroid nodules  Heart: no murmurs, regular rate and rhythm, S1 and S2  Lungs: no wheezes, no rhonci, no rales  Extremities: no lower extremity swelling      Assessment/Plan     1. Obesity (BMI 30.0-34.9)  -restart wegovy 0.25mg weekly, then 0.5mg weekly  -continue to work on lifestyle  -labs " pending with pcp through [facility]         Follow Up:  Sofia 6 months    -labs/tests/notes reviewed  -reviewed and counseled patient on medication monitoring and side effects

## 2025-06-04 ENCOUNTER — OFFICE VISIT (OUTPATIENT)
Facility: CLINIC | Age: 50
End: 2025-06-04
Payer: COMMERCIAL

## 2025-06-04 VITALS
DIASTOLIC BLOOD PRESSURE: 78 MMHG | SYSTOLIC BLOOD PRESSURE: 124 MMHG | WEIGHT: 195.2 LBS | HEIGHT: 64 IN | BODY MASS INDEX: 33.32 KG/M2

## 2025-06-04 DIAGNOSIS — E78.2 MIXED HYPERLIPIDEMIA: ICD-10-CM

## 2025-06-04 DIAGNOSIS — I10 PRIMARY HYPERTENSION: Primary | ICD-10-CM

## 2025-06-04 DIAGNOSIS — E66.811 OBESITY (BMI 30.0-34.9): ICD-10-CM

## 2025-06-04 PROCEDURE — 3008F BODY MASS INDEX DOCD: CPT | Performed by: INTERNAL MEDICINE

## 2025-06-04 PROCEDURE — 99213 OFFICE O/P EST LOW 20 MIN: CPT | Performed by: INTERNAL MEDICINE

## 2025-06-04 PROCEDURE — 3074F SYST BP LT 130 MM HG: CPT | Performed by: INTERNAL MEDICINE

## 2025-06-04 PROCEDURE — 3078F DIAST BP <80 MM HG: CPT | Performed by: INTERNAL MEDICINE

## 2025-06-04 PROCEDURE — 1036F TOBACCO NON-USER: CPT | Performed by: INTERNAL MEDICINE

## 2025-06-04 RX ORDER — SEMAGLUTIDE 0.5 MG/.5ML
0.5 INJECTION, SOLUTION SUBCUTANEOUS
Qty: 2 ML | Refills: 5 | Status: SHIPPED | OUTPATIENT
Start: 2025-06-04

## 2025-06-04 RX ORDER — SEMAGLUTIDE 0.25 MG/.5ML
0.25 INJECTION, SOLUTION SUBCUTANEOUS WEEKLY
Qty: 2 ML | Refills: 0 | Status: SHIPPED | OUTPATIENT
Start: 2025-06-04 | End: 2026-06-04

## 2025-06-04 ASSESSMENT — ENCOUNTER SYMPTOMS
DEPRESSION: 0
LOSS OF SENSATION IN FEET: 0
OCCASIONAL FEELINGS OF UNSTEADINESS: 0

## 2025-06-04 ASSESSMENT — PAIN SCALES - GENERAL: PAINLEVEL_OUTOF10: 5

## 2025-06-04 ASSESSMENT — PATIENT HEALTH QUESTIONNAIRE - PHQ9
1. LITTLE INTEREST OR PLEASURE IN DOING THINGS: NOT AT ALL
2. FEELING DOWN, DEPRESSED OR HOPELESS: NOT AT ALL
SUM OF ALL RESPONSES TO PHQ9 QUESTIONS 1 & 2: 0

## 2025-07-11 ENCOUNTER — TELEPHONE (OUTPATIENT)
Facility: CLINIC | Age: 50
End: 2025-07-11
Payer: COMMERCIAL

## 2025-07-11 DIAGNOSIS — E66.01 MORBID (SEVERE) OBESITY DUE TO EXCESS CALORIES (MULTI): ICD-10-CM

## 2025-07-11 DIAGNOSIS — E66.811 OBESITY (BMI 30.0-34.9): ICD-10-CM

## 2025-07-11 DIAGNOSIS — I10 PRIMARY HYPERTENSION: Primary | ICD-10-CM

## 2025-07-11 RX ORDER — SEMAGLUTIDE 1 MG/.5ML
1 INJECTION, SOLUTION SUBCUTANEOUS
Qty: 2 ML | Refills: 5 | Status: SHIPPED | OUTPATIENT
Start: 2025-07-11